# Patient Record
Sex: MALE | Race: WHITE | NOT HISPANIC OR LATINO | Employment: UNEMPLOYED | URBAN - METROPOLITAN AREA
[De-identification: names, ages, dates, MRNs, and addresses within clinical notes are randomized per-mention and may not be internally consistent; named-entity substitution may affect disease eponyms.]

---

## 2017-11-29 ENCOUNTER — HISTORICAL (OUTPATIENT)
Dept: LAB | Facility: HOSPITAL | Age: 47
End: 2017-11-29

## 2017-11-29 LAB
ABS NEUT (OLG): 4 X10(3)/MCL (ref 1.5–6.9)
APTT PPP: 28.9 SECOND(S) (ref 25–35)
BUN SERPL-MCNC: 7 MG/DL (ref 10–20)
CALCIUM SERPL-MCNC: 9.1 MG/DL (ref 8–10.5)
CHLORIDE SERPL-SCNC: 103 MMOL/L (ref 100–108)
CO2 SERPL-SCNC: 30 MMOL/L (ref 21–35)
CREAT SERPL-MCNC: 0.95 MG/DL (ref 0.7–1.3)
ERYTHROCYTE [DISTWIDTH] IN BLOOD BY AUTOMATED COUNT: 13.2 % (ref 11.5–17)
GLUCOSE SERPL-MCNC: 99 MG/DL (ref 75–116)
HCT VFR BLD AUTO: 48.5 % (ref 42–52)
HGB BLD-MCNC: 16.4 GM/DL (ref 14–18)
INR PPP: 1.1 (ref 0–1.2)
MCH RBC QN AUTO: 31 PG (ref 27–34)
MCHC RBC AUTO-ENTMCNC: 34 GM/DL (ref 31–36)
MCV RBC AUTO: 93 FL (ref 80–99)
PLATELET # BLD AUTO: 248 X10(3)/MCL (ref 140–400)
PMV BLD AUTO: 10.9 FL (ref 6.8–10)
POTASSIUM SERPL-SCNC: 3.4 MMOL/L (ref 3.6–5.2)
PROTHROMBIN TIME: 11.7 SECOND(S) (ref 9–12)
RBC # BLD AUTO: 5.22 X10(6)/MCL (ref 4.7–6.1)
SODIUM SERPL-SCNC: 140 MMOL/L (ref 135–145)
WBC # SPEC AUTO: 7.8 X10(3)/MCL (ref 4.5–11.5)

## 2018-10-31 LAB
ABS NEUT (OLG): 2.48 X10(3)/MCL (ref 2.1–9.2)
ALBUMIN SERPL-MCNC: 3.9 GM/DL (ref 3.4–5)
ALBUMIN/GLOB SERPL: 1.2 RATIO (ref 1.1–2)
ALP SERPL-CCNC: 67 UNIT/L (ref 50–136)
ALT SERPL-CCNC: 30 UNIT/L (ref 12–78)
APPEARANCE, UA: CLEAR
APTT PPP: 29.7 SECOND(S) (ref 24.8–36.9)
AST SERPL-CCNC: 13 UNIT/L (ref 15–37)
BACTERIA SPEC CULT: ABNORMAL /HPF
BASOPHILS # BLD AUTO: 0 X10(3)/MCL (ref 0–0.2)
BASOPHILS NFR BLD AUTO: 0 %
BILIRUB SERPL-MCNC: 0.6 MG/DL (ref 0.2–1)
BILIRUB UR QL STRIP: NEGATIVE
BILIRUBIN DIRECT+TOT PNL SERPL-MCNC: 0.2 MG/DL (ref 0–0.5)
BILIRUBIN DIRECT+TOT PNL SERPL-MCNC: 0.4 MG/DL (ref 0–0.8)
BUN SERPL-MCNC: 12 MG/DL (ref 7–18)
CALCIUM SERPL-MCNC: 8.9 MG/DL (ref 8.5–10.1)
CHLORIDE SERPL-SCNC: 103 MMOL/L (ref 98–107)
CO2 SERPL-SCNC: 30 MMOL/L (ref 21–32)
COLOR UR: YELLOW
CREAT SERPL-MCNC: 0.87 MG/DL (ref 0.7–1.3)
EOSINOPHIL # BLD AUTO: 0.3 X10(3)/MCL (ref 0–0.9)
EOSINOPHIL NFR BLD AUTO: 5 %
ERYTHROCYTE [DISTWIDTH] IN BLOOD BY AUTOMATED COUNT: 13.1 % (ref 11.5–17)
GLOBULIN SER-MCNC: 3.3 GM/DL (ref 2.4–3.5)
GLUCOSE (UA): NEGATIVE
GLUCOSE SERPL-MCNC: 84 MG/DL (ref 74–106)
HCT VFR BLD AUTO: 51.5 % (ref 42–52)
HGB BLD-MCNC: 16.8 GM/DL (ref 14–18)
HGB UR QL STRIP: ABNORMAL
INR PPP: 0.93 (ref 0–1.27)
KETONES UR QL STRIP: NEGATIVE
LEUKOCYTE ESTERASE UR QL STRIP: ABNORMAL
LYMPHOCYTES # BLD AUTO: 2.8 X10(3)/MCL (ref 0.6–4.6)
LYMPHOCYTES NFR BLD AUTO: 46 %
MCH RBC QN AUTO: 31.2 PG (ref 27–31)
MCHC RBC AUTO-ENTMCNC: 32.6 GM/DL (ref 33–36)
MCV RBC AUTO: 95.5 FL (ref 80–94)
MONOCYTES # BLD AUTO: 0.5 X10(3)/MCL (ref 0.1–1.3)
MONOCYTES NFR BLD AUTO: 8 %
NEUTROPHILS # BLD AUTO: 2.48 X10(3)/MCL (ref 2.1–9.2)
NEUTROPHILS NFR BLD AUTO: 41 %
NITRITE UR QL STRIP: NEGATIVE
PH UR STRIP: 5.5 [PH] (ref 5–9)
PLATELET # BLD AUTO: 238 X10(3)/MCL (ref 130–400)
PMV BLD AUTO: 9.5 FL (ref 9.4–12.4)
POTASSIUM SERPL-SCNC: 4.2 MMOL/L (ref 3.5–5.1)
PROT SERPL-MCNC: 7.2 GM/DL (ref 6.4–8.2)
PROT UR QL STRIP: NEGATIVE
PROTHROMBIN TIME: 12.7 SECOND(S) (ref 12.2–14.7)
RBC # BLD AUTO: 5.39 X10(6)/MCL (ref 4.7–6.1)
RBC #/AREA URNS HPF: ABNORMAL /[HPF]
SODIUM SERPL-SCNC: 138 MMOL/L (ref 136–145)
SP GR UR STRIP: 1.02 (ref 1–1.03)
SQUAMOUS EPITHELIAL, UA: ABNORMAL
UROBILINOGEN UR STRIP-ACNC: 0.2
WBC # SPEC AUTO: 6 X10(3)/MCL (ref 4.5–11.5)
WBC #/AREA URNS HPF: ABNORMAL /HPF

## 2018-11-01 ENCOUNTER — HISTORICAL (OUTPATIENT)
Dept: ADMINISTRATIVE | Facility: HOSPITAL | Age: 48
End: 2018-11-01

## 2019-12-03 ENCOUNTER — HOSPITAL ENCOUNTER (OUTPATIENT)
Dept: MEDSURG UNIT | Facility: HOSPITAL | Age: 49
End: 2019-12-05
Attending: INTERNAL MEDICINE | Admitting: INTERNAL MEDICINE

## 2019-12-04 LAB
ABS NEUT (OLG): 4.85 X10(3)/MCL (ref 2.1–9.2)
ABS NEUT (OLG): 4.92 X10(3)/MCL (ref 2.1–9.2)
ALBUMIN SERPL-MCNC: 3.8 GM/DL (ref 3.4–5)
ALBUMIN/GLOB SERPL: 1.2 {RATIO}
ALP SERPL-CCNC: 72 UNIT/L (ref 50–136)
ALT SERPL-CCNC: 28 UNIT/L (ref 12–78)
APTT PPP: 29.4 SECOND(S) (ref 24.8–36.9)
AST SERPL-CCNC: 16 UNIT/L (ref 15–37)
BASOPHILS # BLD AUTO: 0 X10(3)/MCL (ref 0–0.2)
BASOPHILS # BLD AUTO: 0 X10(3)/MCL (ref 0–0.2)
BASOPHILS NFR BLD AUTO: 0 %
BASOPHILS NFR BLD AUTO: 0 %
BILIRUB SERPL-MCNC: 0.6 MG/DL (ref 0.2–1)
BILIRUBIN DIRECT+TOT PNL SERPL-MCNC: 0.1 MG/DL (ref 0–0.2)
BILIRUBIN DIRECT+TOT PNL SERPL-MCNC: 0.5 MG/DL (ref 0–0.8)
BUN SERPL-MCNC: 13 MG/DL (ref 7–18)
CALCIUM SERPL-MCNC: 9.2 MG/DL (ref 8.5–10.1)
CHLORIDE SERPL-SCNC: 104 MMOL/L (ref 98–107)
CHOLEST SERPL-MCNC: 175 MG/DL (ref 0–200)
CHOLEST/HDLC SERPL: 4.4 {RATIO} (ref 0–5)
CK MB SERPL-MCNC: 1.5 NG/ML (ref 0.5–3.6)
CK MB SERPL-MCNC: 110.2 NG/ML (ref 0.5–3.6)
CK MB SERPL-MCNC: 142.7 NG/ML (ref 0.5–3.6)
CK MB SERPL-MCNC: 76 NG/ML (ref 0.5–3.6)
CK SERPL-CCNC: 135 UNIT/L (ref 39–308)
CK SERPL-CCNC: 545 UNIT/L (ref 39–308)
CK SERPL-CCNC: 764 UNIT/L (ref 39–308)
CK SERPL-CCNC: 994 UNIT/L (ref 39–308)
CO2 SERPL-SCNC: 31 MMOL/L (ref 21–32)
CREAT SERPL-MCNC: 0.97 MG/DL (ref 0.7–1.3)
EOSINOPHIL # BLD AUTO: 0.3 X10(3)/MCL (ref 0–0.9)
EOSINOPHIL # BLD AUTO: 0.4 X10(3)/MCL (ref 0–0.9)
EOSINOPHIL NFR BLD AUTO: 4 %
EOSINOPHIL NFR BLD AUTO: 4 %
ERYTHROCYTE [DISTWIDTH] IN BLOOD BY AUTOMATED COUNT: 13.6 % (ref 11.5–17)
ERYTHROCYTE [DISTWIDTH] IN BLOOD BY AUTOMATED COUNT: 13.6 % (ref 11.5–17)
EST. AVERAGE GLUCOSE BLD GHB EST-MCNC: 114 MG/DL
GLOBULIN SER-MCNC: 3.1 GM/DL (ref 2.4–3.5)
GLUCOSE SERPL-MCNC: 123 MG/DL (ref 74–106)
HBA1C MFR BLD: 5.6 % (ref 4.2–6.3)
HCT VFR BLD AUTO: 49.3 % (ref 42–52)
HCT VFR BLD AUTO: 52 % (ref 42–52)
HDLC SERPL-MCNC: 40 MG/DL (ref 35–60)
HGB BLD-MCNC: 16.2 GM/DL (ref 14–18)
HGB BLD-MCNC: 17.3 GM/DL (ref 14–18)
INR PPP: 0.9 (ref 0–1.3)
INR PPP: 0.9 (ref 0–1.3)
LDLC SERPL CALC-MCNC: 101 MG/DL (ref 0–129)
LYMPHOCYTES # BLD AUTO: 2.8 X10(3)/MCL (ref 0.6–4.6)
LYMPHOCYTES # BLD AUTO: 3.6 X10(3)/MCL (ref 0.6–4.6)
LYMPHOCYTES NFR BLD AUTO: 31 %
LYMPHOCYTES NFR BLD AUTO: 38 %
MAGNESIUM SERPL-MCNC: 2.2 MG/DL (ref 1.8–2.4)
MCH RBC QN AUTO: 31 PG (ref 27–31)
MCH RBC QN AUTO: 31 PG (ref 27–31)
MCHC RBC AUTO-ENTMCNC: 32.9 GM/DL (ref 33–36)
MCHC RBC AUTO-ENTMCNC: 33.3 GM/DL (ref 33–36)
MCV RBC AUTO: 93.2 FL (ref 80–94)
MCV RBC AUTO: 94.3 FL (ref 80–94)
MONOCYTES # BLD AUTO: 0.6 X10(3)/MCL (ref 0.1–1.3)
MONOCYTES # BLD AUTO: 0.7 X10(3)/MCL (ref 0.1–1.3)
MONOCYTES NFR BLD AUTO: 6 %
MONOCYTES NFR BLD AUTO: 8 %
NEUTROPHILS # BLD AUTO: 4.85 X10(3)/MCL (ref 2.1–9.2)
NEUTROPHILS # BLD AUTO: 4.92 X10(3)/MCL (ref 2.1–9.2)
NEUTROPHILS NFR BLD AUTO: 51 %
NEUTROPHILS NFR BLD AUTO: 56 %
PLATELET # BLD AUTO: 238 X10(3)/MCL (ref 130–400)
PLATELET # BLD AUTO: 238 X10(3)/MCL (ref 130–400)
PMV BLD AUTO: 10 FL (ref 9.4–12.4)
PMV BLD AUTO: 10.4 FL (ref 9.4–12.4)
POC TROPONIN: 0.03 NG/ML (ref 0–0.08)
POTASSIUM SERPL-SCNC: 4 MMOL/L (ref 3.5–5.1)
PROT SERPL-MCNC: 6.9 GM/DL (ref 6.4–8.2)
PROTHROMBIN TIME: 12.6 SECOND(S) (ref 12–14)
PROTHROMBIN TIME: 12.6 SECOND(S) (ref 12–14)
RBC # BLD AUTO: 5.23 X10(6)/MCL (ref 4.7–6.1)
RBC # BLD AUTO: 5.58 X10(6)/MCL (ref 4.7–6.1)
SODIUM SERPL-SCNC: 141 MMOL/L (ref 136–145)
TRIGL SERPL-MCNC: 169 MG/DL (ref 30–150)
TROPONIN I SERPL-MCNC: 0.02 NG/ML (ref 0.02–0.49)
TROPONIN I SERPL-MCNC: 16.3 NG/ML (ref 0.02–0.49)
TROPONIN I SERPL-MCNC: 30.9 NG/ML (ref 0.02–0.49)
TROPONIN I SERPL-MCNC: 6.79 NG/ML (ref 0.02–0.49)
VLDLC SERPL CALC-MCNC: 34 MG/DL
WBC # SPEC AUTO: 8.8 X10(3)/MCL (ref 4.5–11.5)
WBC # SPEC AUTO: 9.5 X10(3)/MCL (ref 4.5–11.5)

## 2019-12-05 LAB
ABS NEUT (OLG): 5.55 X10(3)/MCL (ref 2.1–9.2)
ABS NEUT (OLG): 5.67 X10(3)/MCL (ref 2.1–9.2)
ALBUMIN SERPL-MCNC: 3.5 GM/DL (ref 3.4–5)
ALBUMIN/GLOB SERPL: 1.2 RATIO (ref 1.1–2)
ALP SERPL-CCNC: 70 UNIT/L (ref 50–136)
ALT SERPL-CCNC: 52 UNIT/L (ref 12–78)
AST SERPL-CCNC: 157 UNIT/L (ref 15–37)
BASOPHILS # BLD AUTO: 0 X10(3)/MCL (ref 0–0.2)
BASOPHILS # BLD AUTO: 0 X10(3)/MCL (ref 0–0.2)
BASOPHILS NFR BLD AUTO: 0 %
BASOPHILS NFR BLD AUTO: 0 %
BILIRUB SERPL-MCNC: 0.8 MG/DL (ref 0.2–1)
BILIRUBIN DIRECT+TOT PNL SERPL-MCNC: 0.2 MG/DL (ref 0–0.5)
BILIRUBIN DIRECT+TOT PNL SERPL-MCNC: 0.6 MG/DL (ref 0–0.8)
BUN SERPL-MCNC: 10 MG/DL (ref 7–18)
CALCIUM SERPL-MCNC: 9.1 MG/DL (ref 8.5–10.1)
CHLORIDE SERPL-SCNC: 106 MMOL/L (ref 98–107)
CK MB SERPL-MCNC: 80.7 NG/ML (ref 0.5–3.6)
CK SERPL-CCNC: 819 UNIT/L (ref 39–308)
CO2 SERPL-SCNC: 28 MMOL/L (ref 21–32)
CREAT SERPL-MCNC: 0.93 MG/DL (ref 0.7–1.3)
EOSINOPHIL # BLD AUTO: 0.3 X10(3)/MCL (ref 0–0.9)
EOSINOPHIL # BLD AUTO: 0.3 X10(3)/MCL (ref 0–0.9)
EOSINOPHIL NFR BLD AUTO: 3 %
EOSINOPHIL NFR BLD AUTO: 3 %
ERYTHROCYTE [DISTWIDTH] IN BLOOD BY AUTOMATED COUNT: 13.6 % (ref 11.5–17)
ERYTHROCYTE [DISTWIDTH] IN BLOOD BY AUTOMATED COUNT: 13.7 % (ref 11.5–17)
GLOBULIN SER-MCNC: 3 GM/DL (ref 2.4–3.5)
GLUCOSE SERPL-MCNC: 101 MG/DL (ref 74–106)
HCT VFR BLD AUTO: 50.3 % (ref 42–52)
HCT VFR BLD AUTO: 50.4 % (ref 42–52)
HGB BLD-MCNC: 16.7 GM/DL (ref 14–18)
HGB BLD-MCNC: 16.7 GM/DL (ref 14–18)
LYMPHOCYTES # BLD AUTO: 2.8 X10(3)/MCL (ref 0.6–4.6)
LYMPHOCYTES # BLD AUTO: 2.9 X10(3)/MCL (ref 0.6–4.6)
LYMPHOCYTES NFR BLD AUTO: 29 %
LYMPHOCYTES NFR BLD AUTO: 30 %
MAGNESIUM SERPL-MCNC: 2 MG/DL (ref 1.8–2.4)
MCH RBC QN AUTO: 30.9 PG (ref 27–31)
MCH RBC QN AUTO: 30.9 PG (ref 27–31)
MCHC RBC AUTO-ENTMCNC: 33.1 GM/DL (ref 33–36)
MCHC RBC AUTO-ENTMCNC: 33.2 GM/DL (ref 33–36)
MCV RBC AUTO: 93.1 FL (ref 80–94)
MCV RBC AUTO: 93.3 FL (ref 80–94)
MONOCYTES # BLD AUTO: 0.7 X10(3)/MCL (ref 0.1–1.3)
MONOCYTES # BLD AUTO: 0.7 X10(3)/MCL (ref 0.1–1.3)
MONOCYTES NFR BLD AUTO: 8 %
MONOCYTES NFR BLD AUTO: 8 %
NEUTROPHILS # BLD AUTO: 5.55 X10(3)/MCL (ref 2.1–9.2)
NEUTROPHILS # BLD AUTO: 5.67 X10(3)/MCL (ref 2.1–9.2)
NEUTROPHILS NFR BLD AUTO: 58 %
NEUTROPHILS NFR BLD AUTO: 59 %
PLATELET # BLD AUTO: 226 X10(3)/MCL (ref 130–400)
PLATELET # BLD AUTO: 226 X10(3)/MCL (ref 130–400)
PMV BLD AUTO: 10.1 FL (ref 9.4–12.4)
PMV BLD AUTO: 9.9 FL (ref 9.4–12.4)
POTASSIUM SERPL-SCNC: 4.3 MMOL/L (ref 3.5–5.1)
PROT SERPL-MCNC: 6.5 GM/DL (ref 6.4–8.2)
RBC # BLD AUTO: 5.4 X10(6)/MCL (ref 4.7–6.1)
RBC # BLD AUTO: 5.4 X10(6)/MCL (ref 4.7–6.1)
SODIUM SERPL-SCNC: 140 MMOL/L (ref 136–145)
TROPONIN I SERPL-MCNC: 28.1 NG/ML (ref 0.02–0.49)
WBC # SPEC AUTO: 9.6 X10(3)/MCL (ref 4.5–11.5)
WBC # SPEC AUTO: 9.6 X10(3)/MCL (ref 4.5–11.5)

## 2020-05-22 ENCOUNTER — HISTORICAL (OUTPATIENT)
Dept: RADIOLOGY | Facility: HOSPITAL | Age: 50
End: 2020-05-22

## 2022-03-05 ENCOUNTER — HISTORICAL (OUTPATIENT)
Dept: ADMINISTRATIVE | Facility: HOSPITAL | Age: 52
End: 2022-03-05

## 2022-03-05 ENCOUNTER — HOSPITAL ENCOUNTER (OUTPATIENT)
Dept: MEDSURG UNIT | Facility: HOSPITAL | Age: 52
End: 2022-03-07
Attending: INTERNAL MEDICINE | Admitting: INTERNAL MEDICINE

## 2022-03-05 LAB
ABS NEUT (OLG): 5.44 (ref 2.1–9.2)
ABS NEUT (OLG): 5.47 (ref 2.1–9.2)
ALBUMIN SERPL-MCNC: 4 G/DL (ref 3.5–5)
ALBUMIN/GLOB SERPL: 1.3 {RATIO} (ref 1.1–2)
ALP SERPL-CCNC: 73 U/L (ref 40–150)
ALT SERPL-CCNC: 20 U/L (ref 0–55)
APTT PPP: 30.9 S (ref 23.2–33.7)
AST SERPL-CCNC: 18 U/L (ref 5–34)
BASOPHILS # BLD AUTO: 0 10*3/UL (ref 0–0.2)
BASOPHILS # BLD AUTO: 0 10*3/UL (ref 0–0.2)
BASOPHILS NFR BLD AUTO: 0 %
BASOPHILS NFR BLD AUTO: 0 %
BILIRUB SERPL-MCNC: 0.6 MG/DL
BILIRUBIN DIRECT+TOT PNL SERPL-MCNC: 0.2 (ref 0–0.5)
BILIRUBIN DIRECT+TOT PNL SERPL-MCNC: 0.4 (ref 0–0.8)
BUN SERPL-MCNC: 9 MG/DL (ref 8.4–25.7)
CALCIUM SERPL-MCNC: 9.1 MG/DL (ref 8.7–10.5)
CHLORIDE SERPL-SCNC: 103 MMOL/L (ref 98–107)
CK MB SERPL-MCNC: 3.4 NG/ML
CK SERPL-CCNC: 136 U/L (ref 30–200)
CO2 SERPL-SCNC: 25 MMOL/L (ref 22–29)
CREAT SERPL-MCNC: 0.97 MG/DL (ref 0.73–1.18)
EOSINOPHIL # BLD AUTO: 0.1 10*3/UL (ref 0–0.9)
EOSINOPHIL # BLD AUTO: 0.2 10*3/UL (ref 0–0.9)
EOSINOPHIL NFR BLD AUTO: 2 %
EOSINOPHIL NFR BLD AUTO: 2 %
ERYTHROCYTE [DISTWIDTH] IN BLOOD BY AUTOMATED COUNT: 13.5 % (ref 11.5–17)
ERYTHROCYTE [DISTWIDTH] IN BLOOD BY AUTOMATED COUNT: 13.5 % (ref 11.5–17)
EST. AVERAGE GLUCOSE BLD GHB EST-MCNC: 119.8 MG/DL
GLOBULIN SER-MCNC: 3.1 G/DL (ref 2.4–3.5)
GLUCOSE SERPL-MCNC: 97 MG/DL (ref 74–100)
HBA1C MFR BLD: 5.8 %
HCT VFR BLD AUTO: 48.8 % (ref 42–52)
HCT VFR BLD AUTO: 50.2 % (ref 42–52)
HEMOLYSIS INTERF INDEX SERPL-ACNC: 17
HEMOLYSIS INTERF INDEX SERPL-ACNC: 18
HGB BLD-MCNC: 16 G/DL (ref 14–18)
HGB BLD-MCNC: 16.7 G/DL (ref 14–18)
ICTERIC INTERF INDEX SERPL-ACNC: 1
INR PPP: 1 (ref 0–1.3)
LIPEMIC INTERF INDEX SERPL-ACNC: 8
LYMPHOCYTES # BLD AUTO: 2 10*3/UL (ref 0.6–4.6)
LYMPHOCYTES # BLD AUTO: 3.2 10*3/UL (ref 0.6–4.6)
LYMPHOCYTES NFR BLD AUTO: 24 %
LYMPHOCYTES NFR BLD AUTO: 34 %
MAGNESIUM SERPL-MCNC: 1.9 MG/DL (ref 1.6–2.6)
MANUAL DIFF? (OHS): NO
MANUAL DIFF? (OHS): NO
MCH RBC QN AUTO: 30.8 PG (ref 27–31)
MCH RBC QN AUTO: 31.1 PG (ref 27–31)
MCHC RBC AUTO-ENTMCNC: 32.8 G/DL (ref 33–36)
MCHC RBC AUTO-ENTMCNC: 33.3 G/DL (ref 33–36)
MCV RBC AUTO: 93.5 FL (ref 80–94)
MCV RBC AUTO: 94 FL (ref 80–94)
MONOCYTES # BLD AUTO: 0.5 10*3/UL (ref 0.1–1.3)
MONOCYTES # BLD AUTO: 0.5 10*3/UL (ref 0.1–1.3)
MONOCYTES NFR BLD AUTO: 5 %
MONOCYTES NFR BLD AUTO: 6 %
NEUTROPHILS # BLD AUTO: 5.44 10*3/UL (ref 2.1–9.2)
NEUTROPHILS # BLD AUTO: 5.47 10*3/UL (ref 2.1–9.2)
NEUTROPHILS NFR BLD AUTO: 58 %
NEUTROPHILS NFR BLD AUTO: 67 %
PLATELET # BLD AUTO: 233 10*3/UL (ref 130–400)
PLATELET # BLD AUTO: 249 10*3/UL (ref 130–400)
PMV BLD AUTO: 10.1 FL (ref 9.4–12.4)
PMV BLD AUTO: 9.8 FL (ref 9.4–12.4)
POTASSIUM SERPL-SCNC: 3.9 MMOL/L (ref 3.5–5.1)
PROT SERPL-MCNC: 7.1 G/DL (ref 6.4–8.3)
PROTHROMBIN TIME: 12.6 S (ref 12.5–14.5)
RBC # BLD AUTO: 5.19 10*6/UL (ref 4.7–6.1)
RBC # BLD AUTO: 5.37 10*6/UL (ref 4.7–6.1)
SARS-COV-2 AG RESP QL IA.RAPID: NEGATIVE
SODIUM SERPL-SCNC: 137 MMOL/L (ref 136–145)
TROPONIN I SERPL-MCNC: 0.34 NG/ML (ref 0–0.04)
WBC # SPEC AUTO: 8.1 10*3/UL (ref 4.5–11.5)
WBC # SPEC AUTO: 9.4 10*3/UL (ref 4.5–11.5)

## 2022-03-06 LAB
CHOLEST SERPL-MCNC: 194 MG/DL
CHOLEST/HDLC SERPL: 6 {RATIO} (ref 0–5)
CK MB SERPL-MCNC: 18.8 NG/ML
CK MB SERPL-MCNC: 24.6 NG/ML
CK SERPL-CCNC: 257 U/L (ref 30–200)
CK SERPL-CCNC: 265 U/L (ref 30–200)
HDLC SERPL-MCNC: 34 MG/DL (ref 35–60)
LDLC SERPL CALC-MCNC: 126 MG/DL (ref 50–140)
TRIGL SERPL-MCNC: 171 MG/DL (ref 34–140)
TROPONIN I SERPL-MCNC: 4.51 NG/ML (ref 0–0.04)
TROPONIN I SERPL-MCNC: 5.02 NG/ML (ref 0–0.04)
VLDLC SERPL CALC-MCNC: 34 MG/DL

## 2022-03-07 LAB
ABS NEUT (OLG): 3.61 (ref 2.1–9.2)
BASOPHILS # BLD AUTO: 0 10*3/UL (ref 0–0.2)
BASOPHILS NFR BLD AUTO: 1 %
BUN SERPL-MCNC: 13 MG/DL (ref 8.4–25.7)
CALCIUM SERPL-MCNC: 8.5 MG/DL (ref 8.7–10.5)
CHLORIDE SERPL-SCNC: 105 MMOL/L (ref 98–107)
CO2 SERPL-SCNC: 23 MMOL/L (ref 22–29)
CREAT SERPL-MCNC: 0.85 MG/DL (ref 0.73–1.18)
CREAT/UREA NIT SERPL: 15
EOSINOPHIL # BLD AUTO: 0.2 10*3/UL (ref 0–0.9)
EOSINOPHIL NFR BLD AUTO: 4 %
ERYTHROCYTE [DISTWIDTH] IN BLOOD BY AUTOMATED COUNT: 13.5 % (ref 11.5–17)
GLUCOSE SERPL-MCNC: 183 MG/DL (ref 74–100)
HCT VFR BLD AUTO: 43.8 % (ref 42–52)
HEMOLYSIS INTERF INDEX SERPL-ACNC: 2
HGB BLD-MCNC: 14.6 G/DL (ref 14–18)
ICTERIC INTERF INDEX SERPL-ACNC: 1
LIPEMIC INTERF INDEX SERPL-ACNC: 7
LYMPHOCYTES # BLD AUTO: 2.1 10*3/UL (ref 0.6–4.6)
LYMPHOCYTES NFR BLD AUTO: 33 %
MANUAL DIFF? (OHS): NO
MCH RBC QN AUTO: 30.7 PG (ref 27–31)
MCHC RBC AUTO-ENTMCNC: 33.3 G/DL (ref 33–36)
MCV RBC AUTO: 92.2 FL (ref 80–94)
MONOCYTES # BLD AUTO: 0.4 10*3/UL (ref 0.1–1.3)
MONOCYTES NFR BLD AUTO: 6 %
NEUTROPHILS # BLD AUTO: 3.61 10*3/UL (ref 2.1–9.2)
NEUTROPHILS NFR BLD AUTO: 56 %
PLATELET # BLD AUTO: 204 10*3/UL (ref 130–400)
PMV BLD AUTO: 10.1 FL (ref 9.4–12.4)
POTASSIUM SERPL-SCNC: 3.6 MMOL/L (ref 3.5–5.1)
RBC # BLD AUTO: 4.75 10*6/UL (ref 4.7–6.1)
SODIUM SERPL-SCNC: 137 MMOL/L (ref 136–145)
WBC # SPEC AUTO: 6.4 10*3/UL (ref 4.5–11.5)

## 2022-04-11 ENCOUNTER — HISTORICAL (OUTPATIENT)
Dept: ADMINISTRATIVE | Facility: HOSPITAL | Age: 52
End: 2022-04-11

## 2022-04-24 VITALS — SYSTOLIC BLOOD PRESSURE: 131 MMHG | DIASTOLIC BLOOD PRESSURE: 82 MMHG | OXYGEN SATURATION: 98 %

## 2022-04-29 NOTE — OP NOTE
Patient:   Cornell Vasquez            MRN: [id number]            FIN: [id number]               Age:   48 years     Sex:  Male     :  1970   Associated Diagnoses:   None   Author:   Gene Phillips IV, MD      Brief Operative Note   Operative Information   Date/ Time:  2018 07:52:00.     Preoperative Diagnosis (painful hardware).     Postoperative Diagnosis (same).     Procedures Performed (Removal of Sternal wires).     Surgeon: Gene Phillips IV, MD.

## 2022-04-29 NOTE — H&P
Patient:   Cornell Vasquez            MRN: [id number]            FIN: [id number]               Age:   48 years     Sex:  Male     :  1970   Associated Diagnoses:   None   Author:   Omar Ham PFrancisco      H/P    cc: painful sternal wires  hpi: 49 yo old s/p cab has painful sternal wires  pmh: htm, hld, obesity, cad  meds: see list  all: nkda  sh: none  fh: none  ros: painful sternum    PE: NAD  VSS  PERRL  RRR  CTAB  POS BS  NEURO INTACT    A: Pain ful sternal wires  P: for removal of wires, risks/benefits discussed with patient

## 2022-04-29 NOTE — ED PROVIDER NOTES
"   Patient:   Cornell Vasquez            MRN: [id number]            FIN: [id number]               Age:   49 years     Sex:  Male     :  1970   Associated Diagnoses:   Chest pain   Author:   Dannie Simmons MD      Basic Information   Time seen: Date & time 12/3/2019 23:58:00.   History source: Patient.   Arrival mode: Private vehicle.   History limitation: None.   Additional information: Patient's physician(s): Williams Chun MD.      History of Present Illness   The patient presents with 50 y/o CM with a hx of CAD, HTN, HLD and CABG x3 presents to the ED c/o bilateral CP radiating to his L shoulder and upper arm that began tonight abruptly while watching TV. He took NTG pta with no relief. He states that he has never experienced this before, and felt no pain when he failed stress test prior to CABG. He also c/o cough that has been going on for a while. He denies nausea, SOB, and dizziness..  The onset was 12/3/2019 21:00:00  and abrupt.  The course/duration of symptoms is constant.  Location: Bilateral chest. Radiating pain: left arm.  upper arm.  left shoulder. The character of symptoms is "just hurts".  The degree at onset was moderate.  The degree at maximum was moderate.  The degree at present is moderate.  The exacerbating factor is none.  The relieving factor is none but not nitroglycerin.  Risk factors consist of coronary artery disease, hypertension and hyperlipidemia.  Prior episodes: cardiac and coronary artery disease.  Therapy today Nitroglycerin.  Associated symptoms: cough, denies shortness of breath, denies nausea and denies dizziness.        Review of Systems   Constitutional symptoms:  Negative except as documented in HPI   Skin symptoms:  Negative except as documented in HPI   Eye symptoms:  Negative except as documented in HPI   ENMT symptoms:  Negative except as documented in HPI   Respiratory symptoms:  Cough, no shortness of breath   Cardiovascular symptoms:  Chest pain "   Gastrointestinal symptoms:  No nausea   Genitourinary symptoms:  Negative except as documented in HPI   Musculoskeletal symptoms:  Negative except as documented in HPI   Neurologic symptoms:  No dizziness,    Psychiatric symptoms:  Negative except as documented in HPI   Endocrine symptoms:  Negative except as documented in HPI.   Hematologic/Lymphatic symptoms:  Negative except as documented in HPI.   Allergy/immunologic symptoms:  Negative except as documented in HPI      Health Status   Allergies: No known allergies.   Medications: Per nurse's notes.      Past Medical/ Family/ Social History   Medical history:    Resolved  CAD - Coronary artery disease (2225595969):  Resolved.  HLD - Hyperlipidemia ([id number]):  Resolved.  HTN - Hypertension ([id number]):  Resolved.  Stented coronary artery ([id number]):  Resolved.  Comments:  10/31/2018 CDT 11:53 CDT - Lisa LITTLEJOHN, Chinyere Morales  removed stent dec 2017.   Surgical history:    Sternal Removal Of Wires / Rewiring (.) on 2018 at 48 Years.  Comments:  2018 8:09 CDT - [facility] LITTLEJOHN, LISA Chou  auto-populated from documented surgical case  Bypass CABG (.) on 2017 at 47 Years.  Comments:  2017 10:22 CST - Amina LITTLEJOHN, Bob SAWYER  auto-populated from documented surgical case  PTCA - Percutaneous transluminal coronary angioplasty ([id number]).  varicose vein.  angiogram..   Family history:    Mother: JUAN PABLO EDWARDS  ()  Cause of Death: MASSIVE HEART ATTACK  Age at Death: 62 years.   CAD - Coronary artery disease  .   Social history:    Social & Psychosocial Habits    Alcohol  2017  Frequency: 1-2 times per year    Employment/School  2018  Status: Employed    Description:  FOR [employer] WITH PHYSICAL NEEDS FOR PULLING HOSES    Home/Environment  2018  Lives with: Children, Spouse    Nutrition/Health  2018  Diet description: DIET DECREASED SINCE SURGERY. NOT HUNGRY FOR ANYTHING    Sleeping concerns: Yes    Comment:  PT IS UNABLE TO REST AND SLEEP WELL AT NIGHT. - 01/09/2018 09:45 - Jeevan LITTLEJOHN, Alia Ocampo    Substance Use  11/07/2018  Use: Never    Tobacco  12/03/2019  Use: Former smoker, quit more    Patient Wants Consult For Cessation Counseling N/A    Abuse/Neglect  12/03/2019  SHX Any signs of abuse or neglect No  , Alcohol use: Occasionally, Tobacco use: Former smoker, Drug use: Denies.   Problem list:    Active Problems (6)  CABG x 3 - Coronary artery bypass grafts x 3   CAD - Coronary artery disease   Former heavy tobacco smoker   GERD - Gastro-esophageal reflux disease   Obesity   Varicose vein   .      Physical Examination               Vital Signs   Vital Signs   12/3/2019 23:10 CST      Oxygen Therapy            Room air    12/3/2019 22:52 CST      Temperature Oral          36.4 DegC                             Temperature Oral (calculated)             97.52 DegF                             Peripheral Pulse Rate     69 bpm                             Respiratory Rate          16 br/min                             SpO2                      98 %                             Oxygen Therapy            Room air                             Systolic Blood Pressure   149 mmHg  HI                             Diastolic Blood Pressure  76 mmHg  .   Measurements   12/3/2019 22:52 CST      Weight Dosing             220 kg                             Weight Measured and Calculated in Lbs     485.01 lb                             Weight Estimated          220 kg                             Height/Length Dosing      170.18 cm                             Height/Length Estimated   170.18 cm                             Body Mass Index Estimated 75.96 kg/m2  .   Basic Oxygen Information   12/3/2019 23:10 CST      Oxygen Therapy            Room air    12/3/2019 22:52 CST      SpO2                      98 %                             Oxygen Therapy            Room air  .   General:  Alert, no acute distress   Skin:  Warm, dry   Head:   Normocephalic, atraumatic   Neck:  Supple, trachea midline, no tenderness   Eye:  Pupils are equal, round and reactive to light, extraocular movements are intact.    Ears, nose, mouth and throat:  Oral mucosa moist, no pharyngeal erythema or exudate.    Cardiovascular:  Regular rate and rhythm, No murmur, Normal peripheral perfusion, No edema.    Respiratory:  Lungs are clear to auscultation, respirations are non-labored, breath sounds are equal, Symmetrical chest wall expansion.    Chest wall:  No tenderness.   Back:  Nontender, Normal range of motion, Normal alignment.    Musculoskeletal:  Normal ROM, normal strength, no tenderness.    Gastrointestinal:  Soft, Nontender, Non distended, Normal bowel sounds   Neurological:  Alert and oriented to person, place, time, and situation, No focal neurological deficit observed, CN II-XII intact.    Lymphatics:  No lymphadenopathy.   Psychiatric:  Cooperative, appropriate mood & affect, normal judgment.       Medical Decision Making   Documents reviewed:  Emergency department nurses' notes.   Orders  Launch Order Profile (Selected)   Inpatient Orders  Ordered  EK19 0:11:00 CST, 19 0:11:00 CST, Patient Bed, Patient Has IV, Standard Precautions, -1, -1, 19 0:11:00 CST  Lidocaine Viscous: 10 mL, form: Soln, Oral, Once, first dose 19 0:11:00 CST, stop date 19 0:11:00 CST, STAT, 2%  Mylanta Max with Simethicone  (400/400/40mg per 5mL) UD Susp: 30 mL, form: Susp, Oral, Once, first dose 19 0:11:00 CST, stop date 19 0:11:00 CST, STAT  hyoscyamine 0.25mg/10mL UD syringe: 0.25 mg, form: Liquid, Oral, Once, first dose 19 0:11:00 CST, stop date 19 0:11:00 CST, STAT  Ordered (Collected)  Automated Diff: STAT collect, 19 0:14:00 CST, Blood, Collected, Once, Stop date 19 0:14:00 CST, Lab Collect, Print Label By Order Location, 19 0:10:00 CST  CBC w/ Auto Diff: STAT collect, 19 0:14:54 CST, BLOOD, Collected,  Stop date 12/04/19 0:14:00 CST, Lab Collect  CK MB: STAT collect, 12/04/19 0:14:54 CST, BLOOD, Collected, Stop date 12/04/19 0:11:00 CST, Lab Collect  CMP: STAT collect, 12/04/19 0:14:54 CST, BLOOD, Collected, Stop date 12/04/19 0:14:00 CST, Lab Collect  CPK: STAT collect, 12/04/19 0:14:54 CST, BLOOD, Collected, Stop date 12/04/19 0:14:00 CST, Lab Collect  Magnesium Level: STAT collect, 12/04/19 0:14:54 CST, BLOOD, Collected, Stop date 12/04/19 0:14:00 CST, Lab Collect  POC iSTAT ER Troponin request: BLOOD, STAT collect, Collected, 12/04/19 0:14:54 CST, Stop date 12/04/19 0:14:00 CST, Lab Collect, Print Label By Order Location  PT: NOW collect, 12/04/19 0:14:54 CST, BLOOD, Collected, Stop date 12/04/19 0:14:00 CST, Lab Collect  Troponin-I: STAT collect, 12/04/19 0:14:54 CST, BLOOD, Collected, Stop date 12/04/19 0:14:00 CST, Lab Collect  Ordered (Exam Started)  XR Chest 1 View: Stat, 12/04/19 0:10:00 CST, Chest Pain, None, Patient Bed, Patient Has IV?, Rad Type, Not Scheduled, 12/04/19 0:10:00 CST.   Electrocardiogram:  Time 12/3/2019 22:52:00, rate 66, normal sinus rhythm, no ectopy, normal [place] & QRS intervals, EP Interp, STT segments Non specific changes.    Results review:  Lab results : Lab View   12/4/2019 0:17 CST       POC Troponin              0.03 ng/mL    12/4/2019 0:14 CST       Sodium Lvl                141 mmol/L                             Potassium Lvl             4.0 mmol/L                             Chloride                  104 mmol/L                             CO2                       31.0 mmol/L                             Calcium Lvl               9.2 mg/dL                             Magnesium Lvl             2.2 mg/dL                             Glucose Lvl               123 mg/dL  HI                             BUN                       13.0 mg/dL                             Creatinine                0.97 mg/dL                             eGFR-AA                   >60 mL/min/1.73 m2  NA                              eGFR-RAHAT                  >60 mL/min/1.73 m2  NA                             Bili Total                0.6 mg/dL                             Bili Direct               0.10 mg/dL                             Bili Indirect             0.50 mg/dL                             AST                       16 unit/L                             ALT                       28 unit/L                             Alk Phos                  72 unit/L                             Total Protein             6.9 gm/dL                             Albumin Lvl               3.80 gm/dL                             Globulin                  3.10 gm/dL                             A/G Ratio                 1.2  NA                             Total CK                  135 unit/L                             CK MB                     1.5 ng/mL                             Troponin-I                0.02 ng/mL                             PT                        12.6 second(s)                             INR                       0.9                             WBC                       9.5 x10(3)/mcL                             RBC                       5.23 x10(6)/mcL                             Hgb                       16.2 gm/dL                             Hct                       49.3 %                             Platelet                  238 x10(3)/mcL                             MCV                       94.3 fL  HI                             MCH                       31.0 pg                             MCHC                      32.9 gm/dL  LOW                             RDW                       13.6 %                             MPV                       10.4 fL                             Abs Neut                  4.85 x10(3)/mcL                             Neutro Auto               51 %  NA                             Lymph Auto                38 %  NA                             Mono Auto                 6 %  NA                              Eos Auto                  4 %  NA                             Abs Eos                   0.4 x10(3)/mcL                             Basophil Auto             0 %  NA                             Abs Neutro                4.85 x10(3)/mcL                             Abs Lymph                 3.6 x10(3)/mcL                             Abs Mono                  0.6 x10(3)/mcL                             Abs Baso                  0.0 x10(3)/mcL  .   Chest X-Ray:  No acute disease process, interpretation by Emergency Physician.       Reexamination/ Reevaluation   Patient took aspirin prior to arrival.  No relief with nitroglycerin prior to arrival.  States he does have a history of some reflux/peptic ulcer disease symptoms although this does not feel like that.  We'll try GI cocktail however.  Patient without relief with GI cocktail.  We'll give IV medications for pain    2 AMsymptoms have resolved.  Repeat EKG showed normal sinus rhythm, nonspecific T changes, no acute STEMI.    Patient is followed by [facility].  Unexplained chest pain, patient never with any angina prior to his stent nor his CABG, he rather just failed a stress test, so not sure what his anginal equivalent may be.  No relief nitroglycerin but also no relief with GI cocktail.  1st set of enzymes are unremarkable.  I think observation admission this morning with serial enzymes would be appropriate      Impression and Plan   Diagnosis   Chest pain (XAP36-ZC R07.9)      Calls-Consults   -  [facility].   Plan   Condition: Stable.    Disposition: Admit time  12/4/2019 02:09:00, Place in Observation Telemetry Unit.    Counseled: Patient, Family, Regarding diagnosis, Regarding diagnostic results, Regarding treatment plan, Patient indicated understanding of instructions.    Notes: I, Pawel Adrian, acted solely as a scribe for and in the presence of Dr. Simmons who performed the service., I  personally performed all of the above services  as recorded in this chart.

## 2022-04-29 NOTE — DISCHARGE SUMMARY
Patient:   Cornell Vasquez            MRN: [id number]            FIN: [id number]               Age:   49 years     Sex:  Male     :  1970   Associated Diagnoses:   None   Author:   Harvey Barry      Please see Progress note From 12.4.19 as DC Summary.    Thanks.

## 2022-04-29 NOTE — OP NOTE
DATE OF SURGERY:    11/01/2018    SURGEON:  Gene Phillips IV, MD    PREOPERATIVE DIAGNOSIS:  Painful sternal hardware.    POSTOPERATIVE DIAGNOSIS:  Painful sternal hardware.    PROCEDURE:  Removal of sternal wires.    ANESTHESIA:  General endotracheal.    DRAINS:  None.    PROCEDURE IN DETAIL:  The patient was taken to the operating room under informed consent and placed on table in supine position where general anesthesia was induced by the anesthesia department.  He was prepped and draped in usual sterile fashion over the chest. Incision made over the previous sternal incision, carried down to the chest wall.  The sternal wires were exposed and then cut and removed; all 7 wires.  The wound was irrigated and closed with a running #1 Vicryl, subcu with 2-0 Vicryl, and the skin with a 3-0 subcuticular stitch.  The patient tolerated this procedure well and left the operating room in stable condition.        ______________________________  Gene Phillips IV, MD    VET/UD  DD:  11/01/2018  Time:  07:54AM  DT:  11/01/2018  Time:  08:59AM  Job #:  [id number]

## 2022-05-14 NOTE — DISCHARGE SUMMARY
Patient:   Cornell Vasquez            MRN: [id number]            FIN: [id number]               Age:   51 years     Sex:  Male     :  1970   Associated Diagnoses:   None   Author:   Devi Kay      Discharge Information      Discharge Summary Information   Admit/Discharge Dates   Admit Date: 2022  Discharge Date: 2022     Physicians   Attending Physician - Joshua WELDON, Carrie KURTZ  Admitting Physician - Carrie Lewis MD  Primary Care Physician - Romaine WELDON, Paul Birch     Discharge Medications   Prescribed  isosorbide mononitrate (Imdur 30 mg oral tablet, extended release) 30 mg, Oral, Daily  Continue  amLODIPine (Norvasc 5 mg oral tablet) 5 mg, Oral, Daily  aspirin (aspirin 81 mg oral tablet) 81 mg, Oral, Daily  clopidogrel (Plavix 75 mg oral tablet) 75 mg, Oral, Daily  nitroglycerin (nitroglycerin 0.4 mg sublingual TAB) 0.4 mg, SL, q5min, PRN chest pain  pantoprazole (Protonix 40 mg ORAL enteric coated tablet) 40 mg, Oral, Daily  rosuvastatin (rosuvastatin 40 mg oral capsule) 40 mg, Oral, Daily  Discontinue  carvedilol (Coreg 3.125 mg oral tablet) 3.125 mg, Oral, BIDWMeal        Followup   [facility]        Hospital Course   Mr. Vasquez is a 50y/o male, followed by Dr. Chun, with PMHx significant for CAD/PCI/3V CABG, hyperlipidemia, hypertension, CVI who presents to ED with c/o Multiple episodes of nonexertional and exertional substernal chest tightness lasting ~ 1hr and relieved with NTG. Denies radiation/SOB/diaphoresis/N/V. Troponin uptrending 0.335 on admit now 5.022 this morning.  BMP/CBC unremarkable.  Covid negative.  VSS.  EKG revealing normal sinus rhythm with nonspecific T wave abnormalities. He is treated with aspirin, Lovenox, and loaded with Plavix. Patient has been admitted to [facility] to evaluate NSTEMI. Patient is currently CP free.  3/7/2022: S/p LHC with patent LIMA to LAD, saphenous to diagonal, known occluded SVG to OM, RCA 40 to 50% stenosis,  circumflex stent patent.  Left main 10 to 20%.. Right groin benign. No further chest pain. Lab stable.        Physical Examination      Vital Signs (last 24 hrs)_____  Last Charted___________  Temp Oral     36.5 DegC  (MAR 07 08:02)  Heart Rate Peripheral   67 bpm  (MAR 07 08:02)  Resp Rate         16 br/min  (MAR 07 08:02)  SBP      123 mmHg  (MAR 07 08:02)  DBP      74 mmHg  (MAR 07 08:02)  SpO2      96 %  (MAR 07 08:02)  Weight      107.3 kg  (MAR 07 05:00)     General:  Alert and oriented, No acute distress.    Respiratory:  Lungs are clear to auscultation, Respirations are non-labored.    Cardiovascular:  Normal rate, Regular rhythm.    Gastrointestinal:  Soft, Non-tender.    Musculoskeletal:  Normal range of motion, Normal strength.    Integumentary:  Warm, Dry.    Neurologic:  Alert, Oriented.    Psychiatric:  Cooperative, Appropriate mood & affect.       Results Review   General results   Today's results   3/7/2022 8:36 CST        WBC                       6.4 x10(3)/mcL                             RBC                       4.75 x10(6)/mcL                             Hgb                       14.6 gm/dL                             Hct                       43.8 %                             Platelet                  204 x10(3)/mcL                             MCV                       92.2 fL                             MCH                       30.7 pg                             MCHC                      33.3 gm/dL                             RDW                       13.5 %                             MPV                       10.1 fL                             Abs Neut                  3.61 x10(3)/mcL                             Neutro Auto               56 %  NA                             Lymph Auto                33 %  NA                             Mono Auto                 6 %  NA                             Eos Auto                  4 %  NA                             Abs Eos                   0.2  x10(3)/mcL                             Basophil Auto             1 %  NA                             Abs Neutro                3.61 x10(3)/mcL                             Abs Lymph                 2.1 x10(3)/mcL                             Abs Mono                  0.4 x10(3)/mcL                             Abs Baso                  0.0 x10(3)/mcL                             Sodium Lvl                137 mmol/L                             Potassium Lvl             3.6 mmol/L                             Chloride                  105 mmol/L                             CO2                       23 mmol/L                             Calcium Lvl               8.5 mg/dL  LOW                             Glucose Lvl               183 mg/dL  HI                             BUN                       13.0 mg/dL                             Creatinine                0.85 mg/dL                             BUN/Creat Ratio           15  NA                             eGFR-AA                   >60  NA                             eGFR-RAHAT                  >60 mL/min/1.73 m2  NA        Discharge Plan   NSTEMI  --troponin uptrending- 5.022  Native CAD  --Hx 3V CABG- LIMA-->LAD, SVG-->OM, SVG--> Diag  HTN  HLD  HbgA1c 5.8       Plan:  --s/p LHC with patent LIMA to LAD, saphenous to diagonal, known occluded SVG to OM, RCA 40 to 50% stenosis, circumflex stent patent.  Left main 10 to 20%.. Right groin benign. No further chest pain. Lab stable.  No BB as patient reports itching/should pain with Coreg and Metoprolol- which stopped when he self stopped these medications. Also reports itching with lisinopril. Resume olmesartan. Continue aspirin, statin, and Plavix.   --BP stable    DC home. Never stop taking Plavix without first speaking to [facility] provider even if another provider wants to stop medication for a procedure.  May remove gauze dressing today. Keep site clean and dry. Wash with soap and water daily. Monitor site and notify MD for  redness, swelling, drainage  or increased pain. No tub baths/lifting > 10#'s x 1 week.

## 2022-05-14 NOTE — ED PROVIDER NOTES
"   Patient:   Cornell Vasquez            MRN: [id number]            FIN: [id number]               Age:   51 years     Sex:  Male     :  1970   Associated Diagnoses:   Non-ST elevation MI (NSTEMI); Acute chest pain; Benign essential hypertension   Author:   Xena Albrecht MD      Basic Information   Time seen: Date & time 3/5/2022 16:02:00.   History source: Patient.   Arrival mode: Private vehicle.   History limitation: None.   Additional information: Patient's physician(s): Williams Boogie MD, Chief Complaint from Nursing Triage Note : Chief Complaint   3/5/2022 16:01 CST       Chief Complaint           pt presents c/o CP / midline. onset yesterday.  PMH cardiac stent/dr boogie.  denies sob.  +smoker.  .      History of Present Illness   The patient presents with chest pain and   A 51 year old male with a history of CAD, HTN, and HLD presents to the ED with 2 episodes of chest pain over the past day. Pt reports that he has had 2 episodes of upper, central chest pain that each lasted about 1 hour. Pt reports that his episode yesterday occurred while he was sitting at his desk and that his episode today began while doing yard work. Pt states that NTG has relieved him and that he has no symptoms at this time. Pt denies any pain radiation, SOB, diaphoresis, nausea, fever, and cough. .  The onset was 1  days ago.  The course/duration of symptoms is episodic: 2  total episodes.  Location: chest. Radiating pain: none. The character of symptoms is "Pain".  The degree at onset was 3 /10.  The degree at maximum was 3 /10.  .  The degree at present is none.  The exacerbating factor is none.  The relieving factor is nitroglycerin.  Risk factors consist of coronary artery disease, hypertension and HLD.  Prior episodes: coronary artery disease.  Therapy today Nitroglycerin.  Associated symptoms: none, denies shortness of breath, denies nausea and denies diaphoresis.        Review of Systems   Constitutional symptoms:  " No fever, no chills, no sweats   Skin symptoms:  No rash, no petechiae.    Eye symptoms:  Vision unchangedNo pain, no discharge, no icterus, no diplopia, no blurred vision, no blindness.    ENMT symptoms:  No ear pain, no sore throat, no nasal congestion, no sinus pain.    Respiratory symptoms:  No shortness of breath, no orthopnea, no cough, no hemoptysis, no stridor, no wheezing.    Cardiovascular symptoms:  Chest pain, central, intermittentNot radiating, no palpitations, no syncope, no diaphoresis, no peripheral edema.    Gastrointestinal symptoms:  No abdominal pain, no nausea, no vomiting, no diarrhea, no constipation, no rectal bleeding, no rectal pain.    Genitourinary symptoms:  No dysuria, no hematuria.    Musculoskeletal symptoms:  No back pain, no Muscle pain   Neurologic symptoms:  No headache, no dizziness, no altered level of consciousness, no numbness, no tingling.    Psychiatric symptoms:  Negative except as documented in HPI.   Endocrine symptoms:  Negative except as documented in HPI.   Hematologic/Lymphatic symptoms:  Negative except as documented in HPI      Health Status   Allergies:    Allergic Reactions (Selected)  No Known Medication Allergies.   Medications:  (Selected)   Prescriptions  Prescribed  Coreg 3.125 mg oral tablet: 3.125 mg = 1 tab(s), Oral, BIDWMeal, # 60 tab(s), 3 Refill(s), [id number]  Norvasc 5 mg oral tablet: 5 mg = 1 tab(s), Oral, Daily, # 90 tab(s), 1 Refill(s), [id number]  Plavix 75 mg oral tablet: 75 mg = 1 tab(s), Oral, Daily, # 90 tab(s), 4 Refill(s), [id number]  Protonix 40 mg ORAL enteric coated tablet: 40 mg = 1 tab(s), Oral, Daily, # 90 tab(s), 1 Refill(s), [id number]  aspirin 81 mg oral tablet: 81 mg = 1 tab(s), Oral, Daily, # 90 tab(s), 4 Refill(s), [id number]  nitroglycerin 0.4 mg sublingual TAB: 0.4 mg = 1 tab(s), SL, q5min, PRN  PRN chest pain, not to exceed 3 doses/15 min--if pain persists, seek medical attention, # 1 bottle(s), 2 Refill(s), Pharmacy: [facility] #[id number]  rosuvastatin 40 mg oral capsule: 40 mg = 1 cap(s), Oral, Daily, # 90 cap(s), 1 Refill(s), Pharmacy: [facility] #[id number].      Past Medical/ Family/ Social History   Medical history:    Resolved  CAD - Coronary artery disease (8509001975):  Resolved.  HLD - Hyperlipidemia (124462955):  Resolved.  HTN - Hypertension ([id number]):  Resolved.  Stented coronary artery ([id number]):  Resolved.  Comments:  10/31/2018 CDT 11:53 CDT - Lisa LITTLEJOHN, Chinyere Morales  removed stent dec 2017.   Surgical history:    Sternal Removal Of Wires / Rewiring (.) performed by Gene Phillips IV, MD on 2018 at 48 Years.  Comments:  2018 8:09 CDT - [facility] LITTLEJOHN, LISA Chou  auto-populated from documented surgical case  Bypass CABG (.) performed by Gene Phillips IV, MD on 2017 at 47 Years.  Comments:  2017 10:22 CST - Amina LITTLEJOHN, Bob SAWYER  auto-populated from documented surgical case  PTCA - Percutaneous transluminal coronary angioplasty ([facility] [id number]).  varicose vein.  angiogram..   Family history:    CAD - Coronary artery disease  Mother (JUAN PABLO EDWARDS, )  .   Social history: Occupation: Employed, Family/social situation: .      Physical Examination               Vital Signs   Vital Signs   3/5/2022 16:01 CST       Temperature Oral          36.8 DegC                             Temperature Oral (calculated)             98.24 DegF                             Peripheral Pulse Rate     88 bpm                             Respiratory Rate          18 br/min                             SpO2                      95 %                             Oxygen Therapy            Room air                             Systolic Blood Pressure   118 mmHg                             Diastolic Blood Pressure  77 mmHg  .   Measurements   3/5/2022 16:01 CST       Weight  Dosing             104.5 kg                             Weight Measured and Calculated in Lbs     230.38 lb                             Weight Estimated          104.5 kg  .   Basic Oxygen Information   3/5/2022 16:01 CST       SpO2                      95 %                             Oxygen Therapy            Room air  .   General:  Alert, no acute distress   Skin:  Warm, dry, intact, Pt has a well healed median sternotomy scar.    Head:  Normocephalic, atraumatic   Neck:  Supple, trachea midline, no tenderness   Eye:  Pupils are equal, round and reactive to light, extraocular movements are intact   Cardiovascular:  Regular rate and rhythm, No murmur, Normal peripheral perfusion   Respiratory:  Lungs are clear to auscultation, respirations are non-labored.    Gastrointestinal:  Soft, Nontender   Back:  Nontender, Normal range of motion.    Musculoskeletal:  Normal ROM, normal strength.    Neurological:  Alert and oriented to person, place, time, and situation, No focal neurological deficit observed.    Psychiatric:  Cooperative, appropriate mood & affect.       Medical Decision Making   Differential Diagnosis:  Unstable angina, angina, anxiety, atypical chest pain, pneumonia, gastroesophageal reflux disease, costochondritis, pleurisy, chest wall pain, dyspnea, congestive heart failure.    Rationale:  pt with chest pain concernig for acs, no pain currently trop elevated, tra score 4, admit to cardiology.   Documents reviewed:  Emergency department nurses' notes, emergency department records, prior records.    Orders  Launch Orders   Laboratory:  CK MB (Order): Stat collect, 3/5/2022 16:18 CST, Blood, Lab Collect, Print Label By Order Location, 3/5/2022 16:18 CST  CK (Order): Stat collect, 3/5/2022 16:18 CST, Blood, Lab Collect, Print Label By Order Location, 3/5/2022 16:18 CST  Troponin-I (Order): Stat collect, 3/5/2022 16:18 CST, Blood, Lab Collect, Print Label By Order Location, 3/5/2022 16:18 CST  CMP (Order):  Stat collect, 3/5/2022 16:18 CST, Blood, Lab Collect, Print Label By Order Location, 3/5/2022 16:18 CST  PTT (Order): Stat collect, 3/5/2022 16:18 CST, Blood, Lab Collect, Print Label By Order Location, 3/5/2022 16:18 CST  PT (Order): Stat collect, 3/5/2022 16:18 CST, Blood, Lab Collect, Print Label By Order Location, 3/5/2022 16:18 CST  CBC w/ Auto Diff (Order): Now collect, 3/5/2022 16:17 CST, Blood, Lab Collect, Print Label By Order Location, 3/5/2022 16:17 CST  Radiology:  XR Chest 1 View (Order): Stat, 3/5/2022 16:18 CST, Dyspnea, None, Patient Bed, Patient Has IV?, Rad Type, Not Scheduled  Cardiology:  EKG (Order): 3/5/2022 16:17 CST, Patient Bed, Patient Has IV, Standard Precautions, NOW, -1, -1, 3/5/2022 16:17 CST.   Electrocardiogram:  Time 3/5/2022 17:20:00, rate 88, normal sinus rhythm, no ectopy, normal HI & QRS intervals, EP Interp, nonspecific t wave abnormality.    Results review:  Lab results : Lab View   3/5/2022 16:37 CST       Sodium Lvl                137 mmol/L                             Potassium Lvl             3.9 mmol/L                             Chloride                  103 mmol/L                             CO2                       25 mmol/L                             Calcium Lvl               9.1 mg/dL                             Glucose Lvl               97 mg/dL                             BUN                       9.0 mg/dL                             Creatinine                0.97 mg/dL                             eGFR-AA                   >60  NA                             eGFR-RAHAT                  >60 mL/min/1.73 m2  NA                             Bili Total                0.6 mg/dL                             Bili Direct               0.2 mg/dL                             Bili Indirect             0.40 mg/dL                             AST                       18 unit/L                             ALT                       20 unit/L                             Alk Phos                   73 unit/L                             Total Protein             7.1 gm/dL                             Albumin Lvl               4.0 gm/dL                             Globulin                  3.1 gm/dL                             A/G Ratio                 1.3 ratio                             Total CK                  136 U/L                             CK MB                     3.4 ng/mL                             Troponin-I                0.335 ng/mL  HI                             WBC                       8.1 x10(3)/mcL                             RBC                       5.37 x10(6)/mcL                             Hgb                       16.7 gm/dL                             Hct                       50.2 %                             Platelet                  249 x10(3)/mcL                             MCV                       93.5 fL                             MCH                       31.1 pg  HI                             MCHC                      33.3 gm/dL                             RDW                       13.5 %                             MPV                       10.1 fL                             Abs Neut                  5.44 x10(3)/mcL                             Neutro Auto               67 %  NA                             Lymph Auto                24 %  NA                             Mono Auto                 6 %  NA                             Eos Auto                  2 %  NA                             Abs Eos                   0.1 x10(3)/mcL                             Basophil Auto             0 %  NA                             Abs Neutro                5.44 x10(3)/mcL                             Abs Lymph                 2.0 x10(3)/mcL                             Abs Mono                  0.5 x10(3)/mcL                             Abs Baso                  0.0 x10(3)/mcL                             PT                        12.6 second(s)                              INR                       1.0                             PTT                       30.9 second(s)  .   Radiology results:  Rad Results (ST)  < 12 hrs   Accession: [id number]  Order: XR Chest 1 View  Report Dt/Tm: 03/05/2022 16:26  Report:      CLINICAL:  Dyspnea.     COMPARISON: May 22, 2020.     FINDINGS:  Cardiopericardial silhouette is within normal limits.   Lungs are without dense focal or segmental consolidation, congestion,  pleural effusion or pneumothorax.       IMPRESSION:     No acute cardiopulmonary process identified.         .      Reexamination/ Reevaluation   Time: 3/5/2022 17:37:00 .   Vital signs   Basic Oxygen Information   3/5/2022 16:01 CST       SpO2                      95 %                             Oxygen Therapy            Room air     Notes: no further episodes of pain, calculated giacomo score for unstable angina or nstemi, GIACOMO score would be 4.      Procedure   Critical care note   Total time: 45 minutes spent engaged in work directly related to patient care and/ or available for direct patient care.   Critical condition(s) addressed for impending deterioration include: cardiovascular.   Associated risk factors: nstemi  .   Management: bedside assessment, supervision of care, Interpretation (chest x-ray, electrocardiogram, blood pressure), Interventions hemodynamic management, Case review medical specialist, Alternate history family.   Performed by: self.      Impression and Plan   Diagnosis   Non-ST elevation MI (NSTEMI) (WNH76-FV I21.4)   Acute chest pain (GCD30-MJ R07.9)   Benign essential hypertension (JQC47-JK I10)      Calls-Consults   -  3/5/2022 17:38:00 , cis.    Plan   Condition: Improved.    Disposition: Admit time  3/5/2022 17:39:00, Place in Observation Telemetry Unit.    Counseled: Patient, Regarding diagnosis, Regarding diagnostic results, Regarding treatment plan, Patient indicated understanding of instructions.    Notes: Camron DASILVA, acted solely as a scribe  for and in the presence of Dr. Levi who performed this service..       Addendum   I, Xena Albrecht MD, performed the history, physical examination, MDM and procedures as above and agree with the scribe's documentation

## 2022-12-09 ENCOUNTER — HOSPITAL ENCOUNTER (OUTPATIENT)
Dept: RADIOLOGY | Facility: HOSPITAL | Age: 52
Discharge: HOME | End: 2022-12-09
Attending: OTOLARYNGOLOGY
Payer: COMMERCIAL

## 2022-12-09 DIAGNOSIS — R05.3 PERSISTENT COUGH: ICD-10-CM

## 2022-12-09 PROCEDURE — 71046 X-RAY EXAM CHEST 2 VIEWS: CPT | Mod: TC

## 2023-03-17 ENCOUNTER — HOSPITAL ENCOUNTER (OUTPATIENT)
Dept: RADIOLOGY | Facility: HOSPITAL | Age: 53
Discharge: HOME | End: 2023-03-17
Attending: FAMILY MEDICINE
Payer: COMMERCIAL

## 2023-03-17 DIAGNOSIS — J20.9 ACUTE BRONCHITIS: ICD-10-CM

## 2023-03-17 PROCEDURE — 71046 X-RAY EXAM CHEST 2 VIEWS: CPT | Mod: TC

## 2024-02-06 ENCOUNTER — LAB VISIT (OUTPATIENT)
Dept: LAB | Facility: HOSPITAL | Age: 54
End: 2024-02-06
Attending: UROLOGY
Payer: COMMERCIAL

## 2024-02-06 DIAGNOSIS — R97.20 ELEVATED PROSTATE SPECIFIC ANTIGEN (PSA): Primary | ICD-10-CM

## 2024-02-06 LAB — PSA SERPL-MCNC: 2.01 NG/ML

## 2024-02-06 PROCEDURE — 36415 COLL VENOUS BLD VENIPUNCTURE: CPT

## 2024-02-06 PROCEDURE — 84153 ASSAY OF PSA TOTAL: CPT

## 2024-02-08 DIAGNOSIS — R31.21 ASYMPTOMATIC MICROSCOPIC HEMATURIA: Primary | ICD-10-CM

## 2024-02-14 ENCOUNTER — LAB VISIT (OUTPATIENT)
Dept: LAB | Facility: HOSPITAL | Age: 54
End: 2024-02-14
Attending: INTERNAL MEDICINE
Payer: COMMERCIAL

## 2024-02-14 DIAGNOSIS — I11.9 HYPERTENSIVE HEART DISEASE, UNSPECIFIED WHETHER HEART FAILURE PRESENT: ICD-10-CM

## 2024-02-14 DIAGNOSIS — I25.10 CORONARY ARTERY DISEASE, UNSPECIFIED VESSEL OR LESION TYPE, UNSPECIFIED WHETHER ANGINA PRESENT, UNSPECIFIED WHETHER NATIVE OR TRANSPLANTED HEART: Primary | ICD-10-CM

## 2024-02-14 DIAGNOSIS — E78.5 HYPERLIPIDEMIA, UNSPECIFIED HYPERLIPIDEMIA TYPE: ICD-10-CM

## 2024-02-14 LAB
ALBUMIN SERPL-MCNC: 3.7 G/DL (ref 3.5–5)
ALBUMIN/GLOB SERPL: 1.2 RATIO (ref 1.1–2)
ALP SERPL-CCNC: 57 UNIT/L (ref 40–150)
ALT SERPL-CCNC: 18 UNIT/L (ref 0–55)
AST SERPL-CCNC: 13 UNIT/L (ref 5–34)
BILIRUB SERPL-MCNC: 0.4 MG/DL
BUN SERPL-MCNC: 10 MG/DL (ref 8.4–25.7)
CALCIUM SERPL-MCNC: 8.6 MG/DL (ref 8.4–10.2)
CHLORIDE SERPL-SCNC: 105 MMOL/L (ref 98–107)
CHOLEST SERPL-MCNC: 112 MG/DL
CHOLEST/HDLC SERPL: 3 {RATIO} (ref 0–5)
CO2 SERPL-SCNC: 27 MMOL/L (ref 22–29)
CREAT SERPL-MCNC: 0.9 MG/DL (ref 0.73–1.18)
GFR SERPLBLD CREATININE-BSD FMLA CKD-EPI: >60 MLS/MIN/1.73/M2
GLOBULIN SER-MCNC: 3 GM/DL (ref 2.4–3.5)
GLUCOSE SERPL-MCNC: 147 MG/DL (ref 74–100)
HDLC SERPL-MCNC: 39 MG/DL (ref 35–60)
LDLC SERPL CALC-MCNC: 56 MG/DL (ref 50–140)
POTASSIUM SERPL-SCNC: 3.1 MMOL/L (ref 3.5–5.1)
PROT SERPL-MCNC: 6.7 GM/DL (ref 6.4–8.3)
SODIUM SERPL-SCNC: 141 MMOL/L (ref 136–145)
TRIGL SERPL-MCNC: 86 MG/DL (ref 34–140)
VLDLC SERPL CALC-MCNC: 17 MG/DL

## 2024-02-14 PROCEDURE — 80053 COMPREHEN METABOLIC PANEL: CPT

## 2024-02-14 PROCEDURE — 36415 COLL VENOUS BLD VENIPUNCTURE: CPT

## 2024-02-14 PROCEDURE — 80061 LIPID PANEL: CPT

## 2024-02-14 PROCEDURE — 82172 ASSAY OF APOLIPOPROTEIN: CPT

## 2024-02-15 LAB — APO B SERPL-MCNC: 56 MG/DL

## 2024-03-04 ENCOUNTER — HOSPITAL ENCOUNTER (OUTPATIENT)
Dept: RADIOLOGY | Facility: HOSPITAL | Age: 54
Discharge: HOME | End: 2024-03-04
Attending: UROLOGY
Payer: COMMERCIAL

## 2024-03-04 DIAGNOSIS — R31.21 ASYMPTOMATIC MICROSCOPIC HEMATURIA: ICD-10-CM

## 2024-03-04 LAB
CREAT SERPL-MCNC: 1.1 MG/DL (ref 0.5–1.4)
SAMPLE: NORMAL

## 2024-03-04 PROCEDURE — 74178 CT ABD&PLV WO CNTR FLWD CNTR: CPT | Mod: TC

## 2024-03-04 PROCEDURE — 25500020 PHARM REV CODE 255: Performed by: UROLOGY

## 2024-03-04 RX ADMIN — IOPAMIDOL 100 ML: 755 INJECTION, SOLUTION INTRAVENOUS at 08:03

## 2024-03-13 ENCOUNTER — LAB VISIT (OUTPATIENT)
Dept: LAB | Facility: HOSPITAL | Age: 54
End: 2024-03-13
Attending: INTERNAL MEDICINE
Payer: COMMERCIAL

## 2024-03-13 DIAGNOSIS — I25.10 CORONARY ARTERY DISEASE, UNSPECIFIED VESSEL OR LESION TYPE, UNSPECIFIED WHETHER ANGINA PRESENT, UNSPECIFIED WHETHER NATIVE OR TRANSPLANTED HEART: ICD-10-CM

## 2024-03-13 DIAGNOSIS — R31.21 ASYMPTOMATIC MICROSCOPIC HEMATURIA: Primary | ICD-10-CM

## 2024-03-13 DIAGNOSIS — E78.5 HYPERLIPIDEMIA, UNSPECIFIED HYPERLIPIDEMIA TYPE: ICD-10-CM

## 2024-03-13 LAB
ANION GAP SERPL CALC-SCNC: 8 MEQ/L
BUN SERPL-MCNC: 13 MG/DL (ref 8.4–25.7)
CALCIUM SERPL-MCNC: 8.6 MG/DL (ref 8.4–10.2)
CHLORIDE SERPL-SCNC: 105 MMOL/L (ref 98–107)
CO2 SERPL-SCNC: 26 MMOL/L (ref 22–29)
CREAT SERPL-MCNC: 0.94 MG/DL (ref 0.73–1.18)
CREAT/UREA NIT SERPL: 14
GFR SERPLBLD CREATININE-BSD FMLA CKD-EPI: >60 MLS/MIN/1.73/M2
GLUCOSE SERPL-MCNC: 97 MG/DL (ref 74–100)
POTASSIUM SERPL-SCNC: 4 MMOL/L (ref 3.5–5.1)
SODIUM SERPL-SCNC: 139 MMOL/L (ref 136–145)

## 2024-03-13 PROCEDURE — 80048 BASIC METABOLIC PNL TOTAL CA: CPT

## 2024-03-13 PROCEDURE — 36415 COLL VENOUS BLD VENIPUNCTURE: CPT

## 2025-05-21 PROBLEM — L02.214 INGUINAL ABSCESS: Status: ACTIVE | Noted: 2025-05-21

## 2025-05-22 ENCOUNTER — ANESTHESIA EVENT (OUTPATIENT)
Dept: SURGERY | Facility: HOSPITAL | Age: 55
End: 2025-05-22
Payer: COMMERCIAL

## 2025-05-22 RX ORDER — ASPIRIN 81 MG/1
81 TABLET ORAL DAILY
COMMUNITY

## 2025-05-22 NOTE — ANESTHESIA PREPROCEDURE EVALUATION
05/22/2025  Cornell Vasquez is a 54 y.o., male.      Pre-op Assessment    I have reviewed the Patient Summary Reports.     I have reviewed the Nursing Notes. I have reviewed the NPO Status.   I have reviewed the Medications.     Review of Systems  Anesthesia Hx:  No problems with previous Anesthesia             Denies Family Hx of Anesthesia complications.    Denies Personal Hx of Anesthesia complications.                    Social:  Non-Smoker       Cardiovascular:  Cardiovascular Normal                                              Pulmonary:  Pulmonary Normal                       Renal/:  Renal/ Normal                 Hepatic/GI:  Hepatic/GI Normal                    Musculoskeletal:  Musculoskeletal Normal                Neurological:  Neurology Normal                                      Endocrine:  Endocrine Normal            Psych:  Psychiatric Normal                    Physical Exam  General: Well nourished, Cooperative, Alert and Oriented    Airway:  Mallampati: II / II  Mouth Opening: Normal  TM Distance: Normal  Tongue: Normal  Neck ROM: Normal ROM    Dental:  Intact  Numerous missing  Chest/Lungs:  Normal Respiratory Rate    Heart:  Rate: Normal  Cabg 2017  MI 2018 w stent placement.  Denies any CP or MI since  Musculoskeletal:  Normal mobility      Anesthesia Plan  Type of Anesthesia, risks & benefits discussed:    Anesthesia Type: MAC  Intra-op Monitoring Plan: Standard ASA Monitors  Post Op Pain Control Plan: multimodal analgesia  Induction:  IV  Informed Consent: Informed consent signed with the Patient and all parties understand the risks and agree with anesthesia plan.  All questions answered.   ASA Score: 3  Day of Surgery Review of History & Physical: H&P Update referred to the surgeon/provider.  Anesthesia Plan Notes: Anesthesia plan was discussed with patient and/or representative.  Risks and alternatives were discussed including the possibility of alteration of plan.     Ready For Surgery From Anesthesia Perspective.     .

## 2025-05-23 ENCOUNTER — HOSPITAL ENCOUNTER (OUTPATIENT)
Facility: HOSPITAL | Age: 55
Discharge: HOME | End: 2025-05-23
Attending: SURGERY | Admitting: FAMILY MEDICINE
Payer: COMMERCIAL

## 2025-05-23 ENCOUNTER — ANESTHESIA (OUTPATIENT)
Dept: SURGERY | Facility: HOSPITAL | Age: 55
End: 2025-05-23
Payer: COMMERCIAL

## 2025-05-23 VITALS
RESPIRATION RATE: 18 BRPM | OXYGEN SATURATION: 95 % | WEIGHT: 242 LBS | HEIGHT: 67 IN | TEMPERATURE: 98 F | DIASTOLIC BLOOD PRESSURE: 60 MMHG | HEART RATE: 55 BPM | BODY MASS INDEX: 37.98 KG/M2 | SYSTOLIC BLOOD PRESSURE: 96 MMHG

## 2025-05-23 DIAGNOSIS — L02.214 INGUINAL ABSCESS: Primary | ICD-10-CM

## 2025-05-23 DIAGNOSIS — I25.10 CAD (CORONARY ARTERY DISEASE): ICD-10-CM

## 2025-05-23 LAB
OHS QRS DURATION: 92 MS
OHS QTC CALCULATION: 435 MS

## 2025-05-23 PROCEDURE — 25000003 PHARM REV CODE 250: Performed by: SURGERY

## 2025-05-23 PROCEDURE — 87075 CULTR BACTERIA EXCEPT BLOOD: CPT | Performed by: SURGERY

## 2025-05-23 PROCEDURE — 63600175 PHARM REV CODE 636 W HCPCS: Performed by: SURGERY

## 2025-05-23 PROCEDURE — 36000704 HC OR TIME LEV I 1ST 15 MIN: Performed by: SURGERY

## 2025-05-23 PROCEDURE — 87070 CULTURE OTHR SPECIMN AEROBIC: CPT | Performed by: SURGERY

## 2025-05-23 PROCEDURE — 37000009 HC ANESTHESIA EA ADD 15 MINS: Performed by: SURGERY

## 2025-05-23 PROCEDURE — 93005 ELECTROCARDIOGRAM TRACING: CPT

## 2025-05-23 PROCEDURE — 36000705 HC OR TIME LEV I EA ADD 15 MIN: Performed by: SURGERY

## 2025-05-23 PROCEDURE — 37000008 HC ANESTHESIA 1ST 15 MINUTES: Performed by: SURGERY

## 2025-05-23 PROCEDURE — 63600175 PHARM REV CODE 636 W HCPCS: Performed by: NURSE ANESTHETIST, CERTIFIED REGISTERED

## 2025-05-23 PROCEDURE — 71000015 HC POSTOP RECOV 1ST HR: Performed by: SURGERY

## 2025-05-23 RX ORDER — LIDOCAINE HYDROCHLORIDE 10 MG/ML
INJECTION, SOLUTION EPIDURAL; INFILTRATION; INTRACAUDAL; PERINEURAL
Status: DISCONTINUED | OUTPATIENT
Start: 2025-05-23 | End: 2025-05-23

## 2025-05-23 RX ORDER — PROPOFOL 10 MG/ML
VIAL (ML) INTRAVENOUS
Status: DISCONTINUED | OUTPATIENT
Start: 2025-05-23 | End: 2025-05-23

## 2025-05-23 RX ORDER — HYDROCODONE BITARTRATE AND ACETAMINOPHEN 5; 325 MG/1; MG/1
1 TABLET ORAL EVERY 4 HOURS PRN
Status: DISCONTINUED | OUTPATIENT
Start: 2025-05-23 | End: 2025-05-23 | Stop reason: HOSPADM

## 2025-05-23 RX ORDER — LIDOCAINE HYDROCHLORIDE AND EPINEPHRINE 10; 10 UG/ML; MG/ML
INJECTION, SOLUTION INFILTRATION; PERINEURAL
Status: DISCONTINUED | OUTPATIENT
Start: 2025-05-23 | End: 2025-05-23 | Stop reason: HOSPADM

## 2025-05-23 RX ORDER — SODIUM CHLORIDE 9 MG/ML
INJECTION, SOLUTION INTRAVENOUS CONTINUOUS
Status: DISCONTINUED | OUTPATIENT
Start: 2025-05-23 | End: 2025-05-23 | Stop reason: HOSPADM

## 2025-05-23 RX ORDER — FENTANYL CITRATE 50 UG/ML
INJECTION, SOLUTION INTRAMUSCULAR; INTRAVENOUS
Status: DISCONTINUED | OUTPATIENT
Start: 2025-05-23 | End: 2025-05-23

## 2025-05-23 RX ORDER — CEFAZOLIN SODIUM 1 G/3ML
2 INJECTION, POWDER, FOR SOLUTION INTRAMUSCULAR; INTRAVENOUS
Status: COMPLETED | OUTPATIENT
Start: 2025-05-23 | End: 2025-05-23

## 2025-05-23 RX ORDER — ONDANSETRON HYDROCHLORIDE 2 MG/ML
4 INJECTION, SOLUTION INTRAVENOUS EVERY 12 HOURS PRN
Status: DISCONTINUED | OUTPATIENT
Start: 2025-05-23 | End: 2025-05-23 | Stop reason: HOSPADM

## 2025-05-23 RX ORDER — ONDANSETRON 4 MG/1
8 TABLET, ORALLY DISINTEGRATING ORAL EVERY 8 HOURS PRN
Status: DISCONTINUED | OUTPATIENT
Start: 2025-05-23 | End: 2025-05-23 | Stop reason: HOSPADM

## 2025-05-23 RX ORDER — MORPHINE SULFATE 2 MG/ML
3 INJECTION, SOLUTION INTRAMUSCULAR; INTRAVENOUS
Status: DISCONTINUED | OUTPATIENT
Start: 2025-05-23 | End: 2025-05-23 | Stop reason: HOSPADM

## 2025-05-23 RX ORDER — OXYCODONE AND ACETAMINOPHEN 5; 325 MG/1; MG/1
1 TABLET ORAL EVERY 6 HOURS PRN
Qty: 15 TABLET | Refills: 0 | Status: SHIPPED | OUTPATIENT
Start: 2025-05-23

## 2025-05-23 RX ORDER — MIDAZOLAM HYDROCHLORIDE 1 MG/ML
INJECTION INTRAMUSCULAR; INTRAVENOUS
Status: DISCONTINUED | OUTPATIENT
Start: 2025-05-23 | End: 2025-05-23

## 2025-05-23 RX ADMIN — LIDOCAINE HYDROCHLORIDE 20 MG: 10 INJECTION, SOLUTION EPIDURAL; INFILTRATION; INTRACAUDAL; PERINEURAL at 09:05

## 2025-05-23 RX ADMIN — MIDAZOLAM HYDROCHLORIDE 2 MG: 1 INJECTION, SOLUTION INTRAMUSCULAR; INTRAVENOUS at 09:05

## 2025-05-23 RX ADMIN — SODIUM CHLORIDE: 9 INJECTION, SOLUTION INTRAVENOUS at 09:05

## 2025-05-23 RX ADMIN — FENTANYL CITRATE 50 MCG: 50 INJECTION INTRAMUSCULAR; INTRAVENOUS at 09:05

## 2025-05-23 RX ADMIN — PROPOFOL 100 MG: 10 INJECTION, EMULSION INTRAVENOUS at 09:05

## 2025-05-23 RX ADMIN — HYDROCODONE BITARTRATE AND ACETAMINOPHEN 1 TABLET: 5; 325 TABLET ORAL at 10:05

## 2025-05-23 RX ADMIN — ONDANSETRON HYDROCHLORIDE 4 MG: 2 SOLUTION INTRAMUSCULAR; INTRAVENOUS at 09:05

## 2025-05-23 RX ADMIN — CEFAZOLIN 2 G: 330 INJECTION, POWDER, FOR SOLUTION INTRAMUSCULAR; INTRAVENOUS at 09:05

## 2025-05-23 NOTE — ANESTHESIA POSTPROCEDURE EVALUATION
Anesthesia Post Evaluation    Patient: Cornell Vasquez    Procedure(s) Performed: Procedure(s) (LRB):  INCISION AND DRAINAGE, INGUINAL REGION (Right)    Final Anesthesia Type: MAC      Patient location during evaluation: med/surg floor  Patient participation: Yes- Able to Participate  Level of consciousness: awake and alert  Post-procedure vital signs: reviewed and stable  Pain management: adequate  Airway patency: patent    PONV status at discharge: No PONV  Anesthetic complications: no      Cardiovascular status: blood pressure returned to baseline  Respiratory status: unassisted  Hydration status: euvolemic  Follow-up not needed.              Vitals Value Taken Time   /64 05/23/25 08:45   Temp 37.1 °C (98.7 °F) 05/23/25 08:42   Pulse 64 05/23/25 08:42   Resp 18 05/23/25 08:42   SpO2 98 % 05/23/25 08:42         No case tracking events are documented in the log.      Pain/Cora Score: No data recorded

## 2025-05-27 LAB — BACTERIA SPEC ANAEROBE CULT: ABNORMAL

## 2025-05-28 PROBLEM — Z48.89 POSTOPERATIVE VISIT: Status: ACTIVE | Noted: 2025-05-28

## 2025-05-28 LAB — BACTERIA TISS AEROBE CULT: NO GROWTH

## (undated) DEVICE — ELECTRODE REM POLYHESIVE II

## (undated) DEVICE — GLOVE PROTEXIS HYDROGEL SZ8

## (undated) DEVICE — NDL HYPO POLYPR STD 26G 1.5IN

## (undated) DEVICE — TRAY SKIN SCRUB DRY PREMIUM

## (undated) DEVICE — STRIP MEDI WND CLSR 1/2X4IN

## (undated) DEVICE — SUT MONOCRYL 4-0 PS-2

## (undated) DEVICE — SPONGE GAUZE 16PLY 4X4

## (undated) DEVICE — ADHESIVE MASTISOL VIAL 48/BX

## (undated) DEVICE — COVER LIGHT HANDLE FLEX GRN

## (undated) DEVICE — SOL IRRI STRL WATER 1000ML

## (undated) DEVICE — SUT CTD VICRYL 3-0 CR/SH

## (undated) DEVICE — Device

## (undated) DEVICE — KIT PROBE COVER GEL 6X72IN

## (undated) DEVICE — DRESSING TRANS 4X4 TEGADERM